# Patient Record
Sex: MALE | Race: WHITE | NOT HISPANIC OR LATINO | Employment: FULL TIME | URBAN - METROPOLITAN AREA
[De-identification: names, ages, dates, MRNs, and addresses within clinical notes are randomized per-mention and may not be internally consistent; named-entity substitution may affect disease eponyms.]

---

## 2019-07-16 ENCOUNTER — TRANSCRIBE ORDERS (OUTPATIENT)
Dept: NUTRITION | Facility: HOSPITAL | Age: 39
End: 2019-07-16

## 2019-07-16 DIAGNOSIS — K20.0 EOSINOPHILIC ESOPHAGITIS: Primary | ICD-10-CM

## 2019-08-06 ENCOUNTER — HOSPITAL ENCOUNTER (OUTPATIENT)
Dept: NUTRITION | Facility: HOSPITAL | Age: 39
Setting detail: RECURRING SERIES
Discharge: HOME | End: 2019-08-06

## 2019-08-06 PROCEDURE — 97802 MEDICAL NUTRITION INDIV IN: CPT | Performed by: DIETITIAN, REGISTERED

## 2019-08-07 VITALS — HEIGHT: 70 IN | BODY MASS INDEX: 39.22 KG/M2 | WEIGHT: 274 LBS

## 2020-01-27 ENCOUNTER — TELEPHONE (OUTPATIENT)
Dept: NUTRITION | Facility: HOSPITAL | Age: 40
End: 2020-01-27

## 2020-01-27 NOTE — PROGRESS NOTES
Called patient for a telephone nutrition follow up over the phone.  States that he has not scheduled an EGD yet with his gastroenterologist as he has not followed his diet.  He is, however, starting to make a plan to follow the 6 food elimination diet very soon.  Continues to eat eggs, dairy, and wheat on a regular basis.  He did try the [employer] vegan buttery spread and several other products recommended at the initial visit.  He is also working on losing weight through diet and exercise.  He has lost 15 kg (33 lbs) since August 2019.  Congratulated patient on his weight loss efforts.  Discussed strategies to following the 6 food elimination diet.  No further follow up requested.  Encouraged patient to call RD if needed.  Thank you again for this referral.